# Patient Record
(demographics unavailable — no encounter records)

---

## 2025-01-08 NOTE — HISTORY OF PRESENT ILLNESS
[FreeTextEntry1] : Mr. Royal is being seen in neuro oncologic follow up after a prolonged hospitalization at [facility].  Mr. Royal is a 62 yo man with a PMH of obesity, HTN, CAD, s/p nstemi and CABG (3/2024) - course complicated by atrial fibrillation and pleural effusions requiring drainage.  In July, he began to experience gait unsteadiness with frequent falls, followed by nausea and vomiting and lethargy. An outpatient CTH reportedly showed ventricular enlargement and he was supposed to see a neurosurgeon - however, as his condition deteriorated, his family brought him to [place] ED. MRI of the brain had shown multiple enhancing intraventricular nodules. CT C/A/P ok - BM neg, PET/CT activity in brain. He underwent a VPS and right parietal biopsy 8/6 (Dr. Byrd) - diffuse large B cell Lymphoma. He was treated with 4 cycles of R-methotrexate - dose of MTX was reduced of 2.5 mg/m2 - complicated by prolonged clearance - LUMA, pancytopenia - melena - diarrhea - PEG placement and was ultimately able to get to [facility] on 11/26.  Family notes that over the past month - he has improved notably - eating - walking with PT - continent - not using PEG (although being flushed) - cognitively significantly improved.

## 2025-01-08 NOTE — DISCUSSION/SUMMARY
[FreeTextEntry1] : 62 yo man - medically complicated - CAD, CABG - presented with hydrocephalus - multiple intraventricular nodules - primary CNS lymphoma. Significant response to treatment but could not possibly tolerate any further mtx. Scan looks excellent today. He has improved toa bout 85% baseline. Would continue PT/OT and follow up in 2 months with repeat imaging. Any interval changes, family knows to call.

## 2025-01-08 NOTE — PHYSICAL EXAM
[FreeTextEntry1] : He is awake and alert - oriented to name, place, month and year. President and President elect. Fluent with full reception Memory 2/3 at 5 min EOMI and VFF Face symmetric and tongue midline Tone normal UE strength full LE good strength Stacia intact Ambulates - no walker with him able to walk with minimal assist.

## 2025-01-08 NOTE — DISCUSSION/SUMMARY
[FreeTextEntry1] : 64 yo man - medically complicated - CAD, CABG - presented with hydrocephalus - multiple intraventricular nodules - primary CNS lymphoma. Significant response to treatment but could not possibly tolerate any further mtx. Scan looks excellent today. He has improved toa bout 85% baseline. Would continue PT/OT and follow up in 2 months with repeat imaging. Any interval changes, family knows to call.

## 2025-01-08 NOTE — HISTORY OF PRESENT ILLNESS
[FreeTextEntry1] : Mr. Royal is being seen in neuro oncologic follow up after a prolonged hospitalization at [facility].  Mr. Royal is a 62 yo man with a PMH of obesity, HTN, CAD, s/p nstemi and CABG (3/2024) - course complicated by atrial fibrillation and pleural effusions requiring drainage.  In July, he began to experience gait unsteadiness with frequent falls, followed by nausea and vomiting and lethargy. An outpatient CTH reportedly showed ventricular enlargement and he was supposed to see a neurosurgeon - however, as his condition deteriorated, his family brought him to [place] ED. MRI of the brain had shown multiple enhancing intraventricular nodules. CT C/A/P ok - BM neg, PET/CT activity in brain. He underwent a VPS and right parietal biopsy 8/6 (Dr. Byrd) - diffuse large B cell Lymphoma. He was treated with 4 cycles of R-methotrexate - dose of MTX was reduced of 2.5 mg/m2 - complicated by prolonged clearance - LUMA, pancytopenia - melena - diarrhea - PEG placement and was ultimately able to get to [facility] on 11/26.  Family notes that over the past month - he has improved notably - eating - walking with PT - continent - not using PEG (although being flushed) - cognitively significantly improved.

## 2025-01-16 NOTE — HISTORY OF PRESENT ILLNESS
[de-identified] : Mr. Genny ruiz 63-year-old male, who was found to have multiple nodules of intraventricular brain tumors, some of which were causing obstructive hydrocephalus.  He is status post an endoscopic intraventricular biopsy of these lesions and placement of a right ventriculoperitoneal shunts by myself on July 26, 2024.  He improved cognitively significantly following the placement of ventriculoperitoneal shunt.  However, unfortunately, the biopsy specimen that was sent during this original surgery was misplaced.  Because of the need for tissue diagnosis, a repeat endoscopic intraventricular biopsy was indicated to obtain a tissue diagnosis on 8/6/2024.  Path:  diffuse large B cell Lymphoma. Charan Capellan at 4  Today he reports feeling well, feels like he is a progressing well, participating more in PT, more alert then before Denies headaches- Sitting or lying. He is fully conversant and cognitively significantly improved.

## 2025-01-16 NOTE — ASSESSMENT
[FreeTextEntry1] : Mr. Genny ruiz 63-year-old male, who was found to have multiple nodules of intraventricular brain tumors, some of which were causing obstructive hydrocephalus.  He is status post an endoscopic intraventricular biopsy of these lesions and placement of a right ventriculoperitoneal shunts by myself on July 26, 2024.  He improved cognitively significantly following the placement of ventriculoperitoneal shunt.  However, unfortunately, the biopsy specimen that was sent during this original surgery was misplaced.  Because of the need for tissue diagnosis, a repeat endoscopic intraventricular biopsy was indicated to obtain a tissue diagnosis on 8/6/2024.  Path:  diffuse large B cell Lymphoma. Charan Certas at 4  Today he reports feeling well, feels like he is a progressing well, participating more in PT, more alert then before Denies headaches- Sitting or lying. He is fully conversant and cognitively significantly improved.  Discussion: Clinically you are doing well- you feel well, symptoms are improving, no subdural hygromas or hematomas, no hedadaches. The right frontal horn is smaller than the left frontal horn, but there is no associated subdural hygroma and he has no headaches, so I do not believe the shunt is overdraining. If we lower the shunt to drain more there is a risk of overdrainage and causing subdural hygromas. If we raise the shunt to drain less, his neurologic symptoms may worsen. Therefore, I  recommend keeping the shunt at the current setting of 4. We interrogated the  shunt valve and confirmed it is still at a setting of 4. MRI shows no evidence of disease MRI as per Dr. Loo in 3/2025   .IDez evaluated the patient with the nurse practitioner Joann Barthelemy and established the plan of care. I personally discuss this patient with the nurse practitioner at the time of the visit. I agree with the assessment and plan as written, unless noted below.

## 2025-01-16 NOTE — PHYSICAL EXAM
[General Appearance - Alert] : alert [General Appearance - In No Acute Distress] : in no acute distress [General Appearance - Well Nourished] : well nourished [Oriented To Time, Place, And Person] : oriented to person, place, and time [Person] : oriented to person [Place] : oriented to place [Time] : oriented to time [Short Term Intact] : short term memory intact [Remote Intact] : remote memory intact [Registration Intact] : recent registration memory intact [Motor Strength] : muscle strength was normal in all four extremities [] : no respiratory distress [Involuntary Movements] : no involuntary movements were seen [Motor Tone] : muscle strength and tone were normal [Skin Color & Pigmentation] : normal skin color and pigmentation

## 2025-01-16 NOTE — HISTORY OF PRESENT ILLNESS
[de-identified] : Mr. Genny ruiz 63-year-old male, who was found to have multiple nodules of intraventricular brain tumors, some of which were causing obstructive hydrocephalus.  He is status post an endoscopic intraventricular biopsy of these lesions and placement of a right ventriculoperitoneal shunts by myself on July 26, 2024.  He improved cognitively significantly following the placement of ventriculoperitoneal shunt.  However, unfortunately, the biopsy specimen that was sent during this original surgery was misplaced.  Because of the need for tissue diagnosis, a repeat endoscopic intraventricular biopsy was indicated to obtain a tissue diagnosis on 8/6/2024.  Path:  diffuse large B cell Lymphoma. Charan Capellan at 4  Today he reports feeling well, feels like he is a progressing well, participating more in PT, more alert then before Denies headaches- Sitting or lying. He is fully conversant and cognitively significantly improved.

## 2025-01-27 NOTE — CARDIOLOGY SUMMARY
[TextEntry] : SHELDON W or WO Ultrasound Enhancing Agent (24 @ 09:43) > 1. Left ventricular systolic function is preserved with an ejection fraction visually estimated at 50 to 55 %. 2. Normal right ventricular cavity size and reduced systolic function. 3. Mild aortic regurgitation. 4. No evidence of left atrial or left atrial appendage thrombus. 5. No pericardial effusion seen.  TTE 3/23/24: CONCLUSIONS: 1. Left ventricular cavity is normal in size. Left ventricular systolic function is normal with an ejection fraction of 55 % by Pepper's method of disks. 2. Mild to moderate left ventricular hypertrophy. 3. Normal right ventricular cavity size, with normal wall thickness, and normal systolic function. 4. The left atrium is severely dilated. 5. Tricuspid aortic valve with normal leaflet excursion with normal systolic excursion. 6. Mild to moderate aortic regurgitation. 7. There is mild calcification of the mitral valve annulus. 8. Trace mitral regurgitation. 9. The inferior vena cava is normal in size measuring 2.00 cm in diameter, (normal <2.1cm) with normal inspiratory collapse (normal >50%) consistent with normal right atrial pressure (~3, range 0-5mmHg). 10. Trace pericardial effusion.  [facility] 3/25/24: Diagnostic Conclusions: Severe diffuse CAD Recommendations: CABG eval at NS with Dr. Prince Access Right radial artery: Coronary Angiography Diagnostic Findings: Coronary Angiography The coronary circulation is co-dominant. LAD Proximal left anterior descending: There is an 85 % stenosis. Mid left anterior descending: There is a 90 % stenosis. Distal left anterior descending: There is a 70 % stenosis. CX Proximal circumflex: There is an 85 % stenosis. RCA Mid right coronary artery: There is a 100 % stenosis.  CABG x 3 on 3/28/24: LIMA to LAD SVG to OM Radial artery Y graft to Ramus  SHELDON/DCCV on 24  EK24: SB, IVCD, TWI EKG 24: SB, IVCD, NSVT EKG 25: NSR, PACs, inferior infarct, poss anterior infarct

## 2025-01-27 NOTE — HISTORY OF PRESENT ILLNESS
[FreeTextEntry1] : Khai is a 63yoM PMH cirrhosis, morbid obesity, NSTEMI s/p LHC with multivessel disease s/p CABG x 3, pAF post op s/p SHELDON/DCCV, newly diagnosed DLBCL who presents for follow up  He was recently admitted to [facility]. He underwent chemotherapy at that time and was dc'd to rehab. He was dc'd on Coreg 6.25mg BID He still has a PEG in place but he is eating and drinking normally He has just left acute rehab on Thursday The PEG will be removed after denture placement  He had an MRI done in 1/2025: no signs of malignancy  He has repeat MRI scheduled for March 2025  Mr. Royal was initially seen at [facility] in 4/2024 for chest pain and was found to have an NSTEMI. Trops at that time peaked at 4500 A LHC was done which showed severe multivessel CAD and he underwent CABG x 3 Course was complicated by resp failure 2/2 volume overload and pleural effusions and he underwent diuresis and chest tube placement by CTS Course was also complicated by AF post op which persisted despite amiodarone and so he underwent SHELDON/DCCV He was seen by EP and recommended to continue Amiodarone and Eliquis for at least 30 days post DCCV (done on 4/4) and was recommended to follow up outpatient to assess for AF burden. Recommended to continue a 3 month course of amiodarone  He was seen by Dr. Prince on April 30th. Lasix was reduced from 80 to 40mg and Aldactone was decreased from 50 to 25mg. His diuretics have now been stopped due to persistent vomiting and nausea.

## 2025-01-27 NOTE — DISCUSSION/SUMMARY
[EKG obtained to assist in diagnosis and management of assessed problem(s)] : EKG obtained to assist in diagnosis and management of assessed problem(s) [FreeTextEntry1] : Khai is a 63yoM PMH cirrhosis, morbid obesity, NSTEMI s/p LHC with multivessel disease s/p CABG x 3, pAF post op s/p SHELDON/DCCV, newly diagnosed DLBCL who presents for a hospital follow up  I have personally reviewed patient's hospitalization including all available labs, imaging and discharge summary.  As noted above, recent hospitalization for newly diagnosed DLBCL. He is s/o chemotherapy (including methotrexate) and a  shunt. He is MUCH improved since I saw him last at [facility]. He is completely himself and has been home from Rehab since Thursday.  He is currently on Coreg 6.125mg BID, Lasix 40mg QD, Potassium, Keppra. He remains off ASA, Eliquis, Lipitor 80mg.  I will obtain a full set of blood work today. Lipitor will need to be resumed, likely at 80mg QD but will follow up his LFTs and lipid panel first.  His CBC will be done as well prior to resuming ASA. I will also reach out to Neuro and NSGY to ensure they are ok with ASA being resumed.  He has been off Eliquis for quite some time now given his hgb drops while in the hospital. A CBC will be done today. A 2 week ziopatch will be done as well to assess for any AF burden. He requires follow up with Dr. Farfan as well to see if Eliquis is needed. There was no evidence of AF during his hospitalization on telemetry. His EKG today shows PACs x 2.   #CAD: s/p CABG - Resume statin after repeat labs done today - ASA and Eliquis held given hgb drop on DC. Plan was to remain off ASA if Eliquis was to be resumed.  - Will repeat a CBC today and resume ASA as able.   #pAF:  - s/p DCCV and EP, following Dr. Farfan, has remained in NSR. Is >3 months from DCCV - Discussed with EP on previous admission. Given his preop SHELDON Showed severe LA dilatation, he has a high risk of recurrence of AF. Tele thus far has not shown any evidence of AF. Recommending outpatient zio on DC and possible ILR with follow up with Dr. Farfan. - 2 week ziopatch to be done, ordered.  - prior TTE from 4/24: LV systolic function is preserved with an ejection fraction visually estimated at 50 to 55%. - A repeat TTE will be done today as well.  - Continue Coreg at 6.25mg BID  #HTN:  - Continue Coreg, BP very well controlled   To follow up very closely in 8-12 weeks.

## 2025-02-06 NOTE — ASSESSMENT
[FreeTextEntry1] : CNS lymphoma s/p chemo with good response  f/u with oncology continue keppra  leg edema restart lasix and potassium - double dose for 3 days - per Dr Santana and call them again in 3 days lasix 40 mg and KCl 20 meq  CAD and A fib continue carvedilol and eliquis  depression continue fluoxetine  hyperlipidemia continue lipitor  f/u 2 months

## 2025-02-06 NOTE — HEALTH RISK ASSESSMENT
[No] : In the past 12 months have you used drugs other than those required for medical reasons? No [Little interest or pleasure doing things] : 1) Little interest or pleasure doing things [Feeling down, depressed, or hopeless] : 2) Feeling down, depressed, or hopeless [0] : 2) Feeling down, depressed, or hopeless: Not at all (0) [PHQ-2 Negative - No further assessment needed] : PHQ-2 Negative - No further assessment needed [[id number]] : 0 [Former] : Former [20 or more] : 20 or more [< 15 Years] : < 15 Years

## 2025-02-06 NOTE — HISTORY OF PRESENT ILLNESS
[Post-hospitalization from ___ Hospital] : Post-hospitalization from [unfilled] Hospital [Admitted on: ___] : The patient was admitted on [unfilled] [Discharged on ___] : discharged on [unfilled] [FreeTextEntry2] : Pt has been in and out of hospital and rehab from July 2024. He was fully in [place] from November.  His recent MRI shows no evidence of any cancer in the brain.  Feeling good, except for some fatigue.  He had heart bypass surgery last year.  Has a ventriculoperitoneal shunt for brain obstructive hydrocephalus. Had brain biopsy and confirmed lymphoma.  4 rounds of chemo.  having home health nurse and PT being set up. Has a heart monitor.  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  Stopped furosemide 40 mg and gained 8 lbs since stopping 3 days ago.  Off eliquis and aspirin for dental extractions.  He does still have a PEG which is not being used.

## 2025-02-06 NOTE — PHYSICAL EXAM
[Normal] : affect was normal and insight and judgment were intact [de-identified] : 1+ edema LE's, bilat [de-identified] : PEG in place

## 2025-03-05 NOTE — HISTORY OF PRESENT ILLNESS
[FreeTextEntry1] : Mr. Royal is being seen in neuro oncologic follow up  Mr. Royal is a 64 yo man with a PMH of obesity, HTN, CAD, s/p nstemi and CABG (3/2024) - course complicated by atrial fibrillation and pleural effusions requiring drainage.  In July, he began to experience gait unsteadiness with frequent falls, followed by nausea and vomiting and lethargy. An outpatient CTH reportedly showed ventricular enlargement and he was supposed to see a neurosurgeon - however, as his condition deteriorated, his family brought him to [place] ED. MRI of the brain had shown multiple enhancing intraventricular nodules. CT C/A/P ok - BM neg, PET/CT activity in brain. 8/6/2024- He underwent a VPS and right parietal biopsy (Dr. Byrd) - diffuse large B cell Lymphoma. He was treated with 4 cycles of R-methotrexate - dose of MTX was reduced of 2.5 mg/m2 - complicated by prolonged clearance - LUMA, pancytopenia - melena - diarrhea - PEG placement and was ultimately able to get to [facility] on 11/26.  1/8/2025- Family notes that over the past month - he has improved notably - eating - walking with PT - continent - not using PEG (although being flushed) - cognitively significantly improved. MRI showed (1/7/2025) No new enhancement to suggest recurrent/progressive disease. 3/5/2025- Here for a follow up. He continues to improve, eating well, PEG out (3/3/2025), continues with PT. Patient followed up with cardiologist today and was recommended to stop Eliquis

## 2025-03-05 NOTE — PHYSICAL EXAM
[General Appearance - Alert] : alert [General Appearance - In No Acute Distress] : in no acute distress [Oriented To Time, Place, And Person] : oriented to person, place, and time [Impaired Insight] : insight and judgment were intact [Affect] : the affect was normal [Mood] : the mood was normal [] : no respiratory distress [Respiration, Rhythm And Depth] : normal respiratory rhythm and effort [Exaggerated Use Of Accessory Muscles For Inspiration] : no accessory muscle use [FreeTextEntry1] : Patient seen and examined Alert, awake , Oriented X 3. Speech clear and fluent Able to spell word " HOUSE " forwards and backwards Able to do simple calculations PERRLA, EOMI, VFF Face symmetrical. Tongue protrudes midline DIAMOND x 4 with good and equal strength FFM, coordination equal bilaterally Sensation intact bilaterally Gait slow but  steady. Ambulating independently- uses a walker

## 2025-03-05 NOTE — DISCUSSION/SUMMARY
[FreeTextEntry1] : 63 yoM  - medically complicated - CAD, CABG - presented with hydrocephalus - multiple intraventricular nodules -endoscopic intraventricular biopsy was indicated to obtain a tissue diagnosis on 8/6/2024.- diffuse large B cell Lymphoma. He was treated with 4 cycles of R-methotrexate - Here with a new MRI.  PRIMARY CNS LYMPHOMA - Neurologically stable MRI reviewed from yesterday and I compared it with MRI from 1/7/2025- To my review  No new enhancement to suggest recurrent/progressive disease. VPS - Codman Certas at 4- checked and confirmed by Neurosurgery NP SEIZURE PROPHYLAXIS - No reported seizures. c/w Keppra 500 mg bid IMBALANCE GAIT - Continue with PT FOLLOW UP- Recommended to follow up with Neuro Ophthalmologist. Referral given. Will do a clinical follow up and MRI on 5/1/2025. In the interim, if any concerns patient knows to call our office

## 2025-03-05 NOTE — END OF VISIT
[FreeTextEntry3] : I, Dr. Ugo Farfan, personally performed the evaluation and management (E/M) services for this established patient who presents today with (a) new problem(s)/exacerbation of (an) existing condition(s).  That E/M includes conducting the examination, assessing all new/exacerbated conditions, and establishing a new plan of care.  Today my NP, Angelica Hernandez, was here to observe my evaluation and management services for this new problem/exacerbated condition to be followed going forward. [Time Spent: ___ minutes] : I have spent [unfilled] minutes of time on the encounter which excludes teaching and separately reported services.

## 2025-03-05 NOTE — REVIEW OF SYSTEMS
[Negative] : Heme/Lymph [Feeling Fatigued] : feeling fatigued [Fever] : no fever [Headache] : no headache [Weight Gain (___ Lbs)] : no recent weight gain [Chills] : no chills [Weight Loss (___ Lbs)] : no recent weight loss [Joint Pain] : no joint pain [Joint Swelling] : no joint swelling [Joint Stiffness] : no joint stiffness [Muscle Cramps] : no muscle cramps [Myalgia] : no myalgia [FreeTextEntry5] : see HPI [FreeTextEntry9] : gait imbalance

## 2025-03-05 NOTE — HISTORY OF PRESENT ILLNESS
[FreeTextEntry1] : I had the pleasure of seeing Khai Royal today in the arrythmia clinic at [facility]. As you well know, he is a pleasant 63-year-old man with a history of HTN, HL, cirrhosis, NSTEMI, CAD s/p 4V CABG (4/2024) with post-operative course c/b pleural effusion that required chest tube placement and AF. For rhythm management, he was loaded on Amiodarone and underwent a successful DCCV on 4/4/2024. He followed up in my clinic post cardioversion at which time his Amiodarone was discontinued with plans to repeat external monitor 1 month following. Unfortunately, shortly after this visit patient was hospitalized at [facility] for new onset dizziness and was diagnosed with obstructive hydrocephalus in the setting of diffuse large B cell Lymphoma. He underwent  shunt placement on 8/16/2024 as well as 4 rounds of chemotherapy, most recently in 10/2024. He has been in and out of rehab and is currently undergoing PT for issues with gait imbalance. He has had 1 mechanical fall since hospital discharge with no head strike. He wore the 2-week Zio from 2/3-2/17/2025 and no recurrent atrial arrythmias were noted. He presents today for follow up. He reports feeling well overall, jsut fatigued from the events that recently transpired. He denies any CP, palpitations, SOB, lightheadedness, dizziness, and syncope.  CHADS2-VASc 2: HTN (1), CAD (1)

## 2025-03-05 NOTE — PHYSICAL EXAM
[Well Developed] : well developed [Well Nourished] : well nourished [No Acute Distress] : no acute distress [Normal Conjunctiva] : normal conjunctiva [Normal Venous Pressure] : normal venous pressure [No Carotid Bruit] : no carotid bruit [Normal S1, S2] : normal S1, S2 [No Murmur] : no murmur [No Rub] : no rub [No Gallop] : no gallop [Clear Lung Fields] : clear lung fields [Good Air Entry] : good air entry [No Respiratory Distress] : no respiratory distress  [Soft] : abdomen soft [Non Tender] : non-tender [No Masses/organomegaly] : no masses/organomegaly [Normal Bowel Sounds] : normal bowel sounds [Normal Gait] : normal gait [No Edema] : no edema [No Cyanosis] : no cyanosis [No Clubbing] : no clubbing [No Varicosities] : no varicosities [No Rash] : no rash [No Skin Lesions] : no skin lesions [Moves all extremities] : moves all extremities [No Focal Deficits] : no focal deficits [Normal Speech] : normal speech [Alert and Oriented] : alert and oriented [Normal memory] : normal memory [Obese] : obese [Normal] : alert and oriented, normal memory

## 2025-03-05 NOTE — CARDIOLOGY SUMMARY
[de-identified] : 6/17/24: Sinus samson low 40s bpm. Old AWMI [de-identified] : 2/3-2/17/2025: Patient had a minimum HR of 43bpm, maximum HR of 169bpm, and an average HR of 67bpm. Predominant underlying rhythm was SR. 2 VT runs occurred, the run with the fastest interval lasting 5 beats with a max HR of 146 bpm, the longest lasting 5 beats with an average rate of 106bpm. 43 SVT runs occurred, the run with the fastest interval lasting 4 beats with a max rate of 169bpm, the longest lasting 14 beats with an average HR of 110bpm. Isolated SVEs were frequent (12%), SVE couplets were rare (<1%), SVE triplets were rare (<1%). Isolated VEs were rare (<1%), VE couplets were rare (<1%), and VE triplets were rare (<0.1%). Ventricular bigeminy and trigeminy were present.    3/5/2025: Sinus rhythm @ 78 with APCs; old anterior infarct  [de-identified] : 1/30/2025: LVEF 55%. No RWMAs. Mild LVH. Normal RVSF. Normal LA/RA. No significant valvulopathies.

## 2025-03-05 NOTE — DISCUSSION/SUMMARY
[FreeTextEntry1] : In summary, Mr. Royal is a 63 year old male who underwent a CABG in 4/2024 following an NSTEMI. He developed AF post-operatively for which he was started on Amiodarone and underwent a successful cardioversion. His Amiodarone was stopped at his office visit in 6/2024 with no recurrent atrial arrythmias noted on Zio worn last month. Given no recurrent AF/AFL and patient's clinical presentation (i.e. gait imbalance, falls), we will discontinue his OAC. Recommended to patient to invest in an external monitoring device, such as a Dinsmore Steele/Apple watch or ImmuMetrixa device, for continued arrythmia surveillance. He is agreeable to this purchase and will promptly alert our office if any AF is documented. He will follow up with our office as needed.  He appeared to understand the whole discussion and verbalized that all of his questions were answered to his satisfaction.  Thank you for allowing me to be involved in the care of this pleasant man. Please feel free to contact me with any questions.  Ugo Farfan MD  of Cardiology Electrophysiology Section [address] [place] [address] Office: [phone] Fax: [phone] [EKG obtained to assist in diagnosis and management of assessed problem(s)] : EKG obtained to assist in diagnosis and management of assessed problem(s)

## 2025-04-02 NOTE — REASON FOR VISIT
[Follow-Up: _____] : a [unfilled] follow-up visit [Family Member] : family member [FreeTextEntry1] : Dizziness

## 2025-04-02 NOTE — PHYSICAL EXAM
[General Appearance - Alert] : alert [General Appearance - In No Acute Distress] : in no acute distress [General Appearance - Well Nourished] : well nourished [Oriented To Time, Place, And Person] : oriented to person, place, and time [Person] : oriented to person [Place] : oriented to place [Time] : oriented to time [Registration Intact] : recent registration memory intact [Motor Strength] : muscle strength was normal in all four extremities [Involuntary Movements] : no involuntary movements were seen [Motor Tone] : muscle strength and tone were normal [Skin Color & Pigmentation] : normal skin color and pigmentation [FreeTextEntry1] : Cerats shunt checked and confirmed twice at 4.  [Affect] : the affect was normal [Cranial Nerves Optic (II)] : visual acuity intact bilaterally,  pupils equal round and reactive to light [Cranial Nerves Oculomotor (III)] : extraocular motion intact [Cranial Nerves Facial (VII)] : face symmetrical [Cranial Nerves Vestibulocochlear (VIII)] : hearing was intact bilaterally [Cranial Nerves Accessory (XI - Cranial And Spinal)] : head turning and shoulder shrug symmetric [Cranial Nerves Hypoglossal (XII)] : there was no tongue deviation with protrusion [FreeTextEntry8] : no drift, mild to moderate imbalance at walk, with walker pt is stable [Sclera] : the sclera and conjunctiva were normal [PERRL With Normal Accommodation] : pupils were equal in size, round, reactive to light, with normal accommodation [Both Tympanic Membranes Were Examined] : both tympanic membranes were normal [] : the neck was supple [Heart Rate And Rhythm] : heart rate was normal and rhythm regular [No Spinal Tenderness] : no spinal tenderness

## 2025-04-02 NOTE — HISTORY OF PRESENT ILLNESS
[FreeTextEntry1] : Mr. Genny ruiz 63-year-old male, who was found to have multiple nodules of intraventricular brain tumors, some of which were causing obstructive hydrocephalus.  He is status post an endoscopic intraventricular biopsy of these lesions and placement of a right ventriculoperitoneal shunts (CERTAS) by Dr. Byrd on July 26, 2024.  He improved cognitively significantly following the placement of ventriculoperitoneal shunt.  A repeat endoscopic intraventricular biopsy was indicated to obtain a tissue diagnosis on 8/6/2024. Path:  diffuse large B cell Lymphoma. Codman Certas shunt was at 4.   Today he reports having dizziness and balance issue from this morning. He is here to check the shunt as he was advised to do so if any dizziness happens. He had no MRI done recently, last MRI was a month ago and the shunt was checked after the imaging.  He was supposed to go to work but feels significant dizziness and balance issues. He feels it more at walking with imbalance. He had his breakfast, hydrating well. He does PT for strengthening. Pt has upcoming appointment with cardiology in the next week. He has MRI head scheduled in 5/1/25 with Dr. Rivera. He denies headaches,

## 2025-04-02 NOTE — ASSESSMENT
[FreeTextEntry1] : IMPRESSION  63-year-old male, who was found to have multiple nodules of intraventricular brain tumors and s/p endoscopic placement of a right ventriculoperitoneal shunts on July 26, 2024 by Dr. Byrd.  He improved cognitively significantly following the placement of ventriculoperitoneal shunt.  A repeat endoscopic intraventricular biopsy was indicated to obtain a tissue diagnosis on 8/6/2024. Path:  diffuse large B cell Lymphoma. Codman Certas shunt was set at 4 as per the last note. Pt reports having dizziness and balance issue from this morning and he is here to check the shunt as he was advised to do so if any dizziness happens. He had no MRI done recently, last MRI was a month ago and the shunt was checked after that imaging.  He feels his dizziness is more at walking associated with imbalance. He had his breakfast, hydrating well. He has MRI head scheduled in 5/1/25 with Dr. Rivera. Pt does not have any other symptoms, denies headache.   PLAN:  Shunt checked and confirmed at 4, reconfirmed twice Advised that he should see his PCP and do more work up May go to urgent care/ ER with worsening symptoms Advised to discuss with Dr. Rivera about possibility of rescheduling MRI to an earlier time, may ask Antivert for dizziness  Discussed the possibility of doing some balance therapy if all work up comes back negative, may discuss with his doctors  F/U with Dr. Rivera/Dr. Byrd as scheduled.

## 2025-04-09 NOTE — HISTORY OF PRESENT ILLNESS
[Family Member] : family member [FreeTextEntry1] : follow up CNS lymphoma [de-identified] : Had PEG removed a couple weeks ago. Getting fitted for dentures. 5/7/25 has neuro ophthalmology appt.  He is eating soft foods and not the healthiest diet.  He had cholesterol checked today.  He uses a walker to walk.  Had MI 3/22/24 and stopped smoking at that time.  Stopped drinking alcohol in 1992.

## 2025-04-09 NOTE — DISCUSSION/SUMMARY
[EKG obtained to assist in diagnosis and management of assessed problem(s)] : EKG obtained to assist in diagnosis and management of assessed problem(s) [FreeTextEntry1] : Khai is a 63yoM PMH cirrhosis, morbid obesity, NSTEMI s/p LHC with multivessel disease s/p CABG x 3, pAF post op s/p SHELDON/DCCV, newly diagnosed DLBCL who presents for follow up  As noted above, previous hospitalization for newly diagnosed DLBCL. He is s/o chemotherapy (including methotrexate) and a  shunt. He is much improved.  He is currently on Coreg 6.125mg BID, Lasix 40mg QD, Potassium, Keppra. He remains off Eliquis given no recurrence of AF, seen by Dr. Johnson. He is back on ASA 81mg daily and Lipitor 40mg daily. A repeat direct ldl will be done today to ensure he does not need 80mg for an LDL goal of <70.   He has been off Eliquis for quite some time now given his hgb drops while in the hospital. A CBC will be done today. A 2 week ziopatch was done which showed no AF but frequent PVCs at 12.0%.   #CAD: s/p CABG - Lipitor 40mg was resumed in 1/2025, a repeat lipid panel and direct LDL will be done today.  - ASA now resumed.   #pAF: - s/p DCCV and EP, following Dr. Johnson, has remained in [facility]. Is >3 months from DCCV - Discussed with EP on previous admission. Given his preop SHELDON Showed severe LA dilatation, he has a high risk of recurrence of AF. A zio was done which showed no AF but frequent PVCs at 12.0%. He has been seen by Dr johnson and remains off AC with monitoring with his apple watch - prior TTE from 4/24: LV systolic function is preserved with an ejection fraction visually estimated at 50 to 55%. - A repeat TTE was done 1/2025 which showed normal LV function, normal LA size, mild AI, dilated aorta. Repeat TTE to be done in ~1 year.  - Continue Coreg at 6.25mg BID  #HTN: - Continue Coreg, BP very well controlled  To follow up in 3 months.

## 2025-04-09 NOTE — HISTORY OF PRESENT ILLNESS
[FreeTextEntry1] : Khai is a 63yoM PMH cirrhosis, morbid obesity, NSTEMI s/p LHC with multivessel disease s/p CABG x 3, pAF post op s/p SHELDON/DCCV, newly diagnosed DLBCL who presents for follow up  He was last seen in January of 2025 and was overall feeling well.  BPs at home have been in the 130s systolic when taken 2 hours after meds 1 week ago, he felt unsteady on his feet and could not walk in a straight line A repeat MRI was done which was stable and the  shunt was checked and they were told it was normal. BP was normal at the time. He felt unsteady on his feet.  PEG was removed on 3/10/25 He was seen by Dr. Farfan and taken off Eliquis given no recurrence of AF  He was previously admitted to [facility] (~12/2024). He underwent chemotherapy at that time and was dc'd to rehab. He was dc'd on Coreg 6.25mg BID He still has a PEG in place but he is eating and drinking normally He has just left acute rehab on Thursday The PEG will be removed after denture placement He had an MRI done in 1/2025: no signs of malignancy He has repeat MRI scheduled for March 2025  Mr. Royal was initially seen at [facility] in 4/2024 for chest pain and was found to have an NSTEMI. Trops at that time peaked at 4500 A LHC was done which showed severe multivessel CAD and he underwent CABG x 3 Course was complicated by resp failure 2/2 volume overload and pleural effusions and he underwent diuresis and chest tube placement by CTS Course was also complicated by AF post op which persisted despite amiodarone and so he underwent SHELDON/DCCV He was seen by EP and recommended to continue Amiodarone and Eliquis for at least 30 days post DCCV (done on 4/4) and was recommended to follow up outpatient to assess for AF burden. Recommended to continue a 3 month course of amiodarone  He was seen by Dr. Prince on April 30th. Lasix was reduced from 80 to 40mg and Aldactone was decreased from 50 to 25mg. His diuretics have now been stopped due to persistent vomiting and nausea.

## 2025-04-09 NOTE — CARDIOLOGY SUMMARY
[TextEntry] : SHELDON W or WO Ultrasound Enhancing Agent (24 @ 09:43) > 1. Left ventricular systolic function is preserved with an ejection fraction visually estimated at 50 to 55 %. 2. Normal right ventricular cavity size and reduced systolic function. 3. Mild aortic regurgitation. 4. No evidence of left atrial or left atrial appendage thrombus. 5. No pericardial effusion seen.  TTE 3/23/24: 1. Left ventricular cavity is normal in size. Left ventricular systolic function is normal with an ejection fraction of 55 % by Pepper's method of disks. 2. Mild to moderate left ventricular hypertrophy. 3. Normal right ventricular cavity size, with normal wall thickness, and normal systolic function. 4. The left atrium is severely dilated. 5. Tricuspid aortic valve with normal leaflet excursion with normal systolic excursion. 6. Mild to moderate aortic regurgitation. 7. There is mild calcification of the mitral valve annulus. 8. Trace mitral regurgitation. 9. The inferior vena cava is normal in size measuring 2.00 cm in diameter, (normal <2.1cm) with normal inspiratory collapse (normal >50%) consistent with normal right atrial pressure (~3, range 0-5mmHg). 10. Trace pericardial effusion.  [facility] 3/25/24: Diagnostic Conclusions: Severe diffuse CAD Recommendations: CABG eval at NS with Dr. Prince The coronary circulation is co-dominant. LAD Proximal left anterior descending: There is an 85 % stenosis. Mid left anterior descending: There is a 90 % stenosis. Distal left anterior descending: There is a 70 % stenosis. CX Proximal circumflex: There is an 85 % stenosis. RCA Mid right coronary artery: There is a 100 % stenosis.  CABG x 3 on 3/28/24: LIMA to LAD SVG to OM Radial artery Y graft to Ramus  SHELDON/DCCV on 24  EK24: SB, IVCD, TWI EKG 24: SB, IVCD, NSVT EKG 25: NSR, PACs, inferior infarct, poss anterior infarct EKG 25: SR, frequent PACs, infetolateral TWI  Ziopatch 2025: SR, 2 episodes of NSVT, frequent PVCs at 12.0%.   TTE 2025: 1. Left ventricular cavity is normal in size. Left ventricular systolic function is normal with an ejection fraction of 55 % by Pepper's method of disks. There are no regional wall motion abnormalities seen. 2. Mild left ventricular hypertrophy. 3. Normal right ventricular cavity size and normal right ventricular systolic function. 4. Left atrium is normal in size. 6. Mild mitral regurgitation. 8. Mild aortic regurgitation. 9. Aortic root at the sinuses of Valsalva is dilated, measuring 4.20 cm (indexed 1.98 cm/m).

## 2025-04-09 NOTE — ASSESSMENT
[FreeTextEntry1] : CNS lymphoma improved and not seen on recent scan he returned to work part time continue keppra  HTN continue carvedilol  hyperlipidemia continue lipitor  depression continue fluoxetine  insomnia continue melatonin

## 2025-04-09 NOTE — HEALTH RISK ASSESSMENT
[No] : In the past 12 months have you used drugs other than those required for medical reasons? No [Little interest or pleasure doing things] : 1) Little interest or pleasure doing things [Feeling down, depressed, or hopeless] : 2) Feeling down, depressed, or hopeless [0] : 2) Feeling down, depressed, or hopeless: Not at all (0) [PHQ-2 Negative - No further assessment needed] : PHQ-2 Negative - No further assessment needed [Former] : Former [20 or more] : 20 or more [< 15 Years] : < 15 Years [[id number]] : 0

## 2025-06-02 NOTE — HISTORY OF PRESENT ILLNESS
[FreeTextEntry1] : Mr. Royal is being seen in neuro oncologic follow up  Mr. Royal is a 64 yo man with a PMH of obesity, HTN, CAD, s/p nstemi and CABG (3/2024) - course complicated by atrial fibrillation and pleural effusions requiring drainage.  In July, he began to experience gait unsteadiness with frequent falls, followed by nausea and vomiting and lethargy. An outpatient CTH reportedly showed ventricular enlargement and he was supposed to see a neurosurgeon - however, as his condition deteriorated, his family brought him to [place] ED. MRI of the brain had shown multiple enhancing intraventricular nodules. CT C/A/P ok - BM neg, PET/CT activity in brain. 8/6/2024- He underwent a VPS and right parietal biopsy (Dr. Byrd) - diffuse large B cell Lymphoma. He was treated with 4 cycles of R-methotrexate - dose of MTX was reduced of 2.5 mg/m2 - complicated by prolonged clearance - LUMA, pancytopenia - melena - diarrhea - PEG placement and was ultimately able to get to [facility] on 11/26.  1/8/2025- Family notes that over the past month - he has improved notably - eating - walking with PT - continent - not using PEG (although being flushed) - cognitively significantly improved. MRI showed (1/7/2025) No new enhancement to suggest recurrent/progressive disease. 3/5/2025- He continues to improve, eating well, PEG out (3/3/2025), continues with PT. Patient followed up with cardiologist today and was recommended to stop Eliquis. MRI stable. 4/8/2025- MRI stable

## 2025-06-03 NOTE — REASON FOR VISIT
[Follow-Up: _____] : a [unfilled] follow-up visit [FreeTextEntry1] : Mr. Genny ruiz 63-year-old male, who was found to have multiple nodules of intraventricular brain tumors, some of which were causing obstructive hydrocephalus. He is status post an endoscopic intraventricular biopsy of these lesions and placement of a right ventriculoperitoneal shunts by Dr. Byrd on July 26, 2024. He improved cognitively significantly following the placement of ventriculoperitoneal shunt. However, unfortunately, the biopsy specimen that was sent during this original surgery was misplaced. Because of the need for tissue diagnosis, a repeat endoscopic intraventricular biopsy was indicated to obtain a tissue diagnosis on 8/6/2024.  Today he presents to office for shunt check after MRI  Charan Capellan at 4

## 2025-06-04 NOTE — PHYSICAL EXAM
[General Appearance - Alert] : alert [General Appearance - In No Acute Distress] : in no acute distress [Oriented To Time, Place, And Person] : oriented to person, place, and time [] : no respiratory distress [Respiration, Rhythm And Depth] : normal respiratory rhythm and effort [Exaggerated Use Of Accessory Muscles For Inspiration] : no accessory muscle use [Edema] : there was no peripheral edema [FreeTextEntry1] : Patient seen and examined Alert, awake , Oriented X 3. Speech clear and fluent Memory at 3 minutes - 2/3, with cues 3/3 PERRLA, EOMI, VFF Face symmetrical. Tongue protrudes midline DIAMOND x 4 with good and equal strength FFM, coordination equal bilaterally Sensation intact bilaterally Gait slow but steady. Ambulating independently- uses a cane

## 2025-06-04 NOTE — HISTORY OF PRESENT ILLNESS
[FreeTextEntry1] : Mr. Royal is being seen in neuro oncologic follow up  Mr. Royal is a 62 yo man with a PMH of obesity, HTN, CAD, s/p nstemi and CABG (3/2024) - course complicated by atrial fibrillation and pleural effusions requiring drainage.  In July, he began to experience gait unsteadiness with frequent falls, followed by nausea and vomiting and lethargy. An outpatient CTH reportedly showed ventricular enlargement and he was supposed to see a neurosurgeon - however, as his condition deteriorated, his family brought him to [place] ED. MRI of the brain had shown multiple enhancing intraventricular nodules. CT C/A/P ok - BM neg, PET/CT activity in brain. 8/6/2024- He underwent a VPS and right parietal biopsy (Dr. Byrd) - diffuse large B cell Lymphoma. He was treated with 4 cycles of R-methotrexate - dose of MTX was reduced of 2.5 mg/m2 - complicated by prolonged clearance - LUMA, pancytopenia - melena - diarrhea - PEG placement and was ultimately able to get to [facility] on 11/26.  1/8/2025- Family notes that over the past month - he has improved notably - eating - walking with PT - continent - not using PEG (although being flushed) - cognitively significantly improved. MRI showed (1/7/2025) No new enhancement to suggest recurrent/progressive disease. 3/5/2025- He continues to improve, eating well, PEG out (3/3/2025), continues with PT. Patient followed up with cardiologist today and was recommended to stop Eliquis. MRI stable. 4/8/2025- MRI stable 6/4/2025- Here for a follow up. Reports overall doing ok. He lives alone and manages to take care of himself, working full time. His daughter getting  on the 20th and is looking forward for it.

## 2025-06-04 NOTE — DISCUSSION/SUMMARY
[FreeTextEntry1] : 63 yoM - medically complicated - CAD, CABG - presented with hydrocephalus - multiple intraventricular nodules -endoscopic intraventricular biopsy was indicated to obtain a tissue diagnosis on 8/6/2024.- diffuse large B cell Lymphoma. He was treated with 4 cycles of R-methotrexate - Here with a new MRI. PRIMARY CNS LYMPHOMA - Neurologically stable MRI reviewed from today and I compared it with MRI from 4/8/2025,  3/5/2025- To my review No new enhancement to suggest recurrent/progressive disease. VPS - Codman Certas at 4- checked and confirmed by Neurosurgery NP Joann Barthelemy SEIZURE PROPHYLAXIS - No reported seizures. c/w Keppra 500 mg bid FOLLOW UP- Will do a clinical follow up and MRI on 8/6/2025. (coordinate with Ale LEE for shunt check as well pls) In the interim, if any concerns patient knows to call our office.

## 2025-06-18 NOTE — HISTORY OF PRESENT ILLNESS
[Coronary Artery Disease] : coronary artery disease [(Patient denies any chest pain, claudication, dyspnea on exertion, orthopnea, palpitations or syncope)] : Patient denies any chest pain, claudication, dyspnea on exertion, orthopnea, palpitations or syncope [Aortic Stenosis] : no aortic stenosis [Atrial Fibrillation] : no atrial fibrillation [Recent Myocardial Infarction] : no recent myocardial infarction [Implantable Device/Pacemaker] : no implantable device/pacemaker [Smoker] : not a smoker [Family Member] : no family member with adverse anesthesia reaction/sudden death [Self] : no previous adverse anesthesia reaction [Chronic Anticoagulation] : no chronic anticoagulation [Chronic Kidney Disease] : no chronic kidney disease [Diabetes] : no diabetes [FreeTextEntry1] : cataract surgery [FreeTextEntry4] : Here for preop clearance for cataract surgery.  h/o MI March 2024 and s/p CABG March 2024. Quit smoking at that time.  He no longer takes melatonin.  s/p treatment for CNS lymphoma and still has a shunt. He is here today with his wife.   [Spouse] : spouse

## 2025-06-18 NOTE — ASSESSMENT
[Patient Optimized for Surgery] : Patient optimized for surgery [FreeTextEntry4] : CAD, s/p CABG stable continue ASA 81 mg  HTN continue carvedilol and losartan  hyperlipidemia continue lipitor  depression continue fluoxetine  obesity and hepatic steatosis advised healthy eating and lose weight  CNS lymphoma responded well to tx he continue to take keppra

## 2025-06-18 NOTE — PHYSICAL EXAM
[No Acute Distress] : no acute distress [Well Nourished] : well nourished [Well Developed] : well developed [Well-Appearing] : well-appearing [Normal Sclera/Conjunctiva] : normal sclera/conjunctiva [PERRL] : pupils equal round and reactive to light [EOMI] : extraocular movements intact [Normal Outer Ear/Nose] : the outer ears and nose were normal in appearance [Normal Oropharynx] : the oropharynx was normal [No JVD] : no jugular venous distention [No Lymphadenopathy] : no lymphadenopathy [Supple] : supple [Thyroid Normal, No Nodules] : the thyroid was normal and there were no nodules present [No Respiratory Distress] : no respiratory distress  [No Accessory Muscle Use] : no accessory muscle use [Clear to Auscultation] : lungs were clear to auscultation bilaterally [Normal Rate] : normal rate  [Regular Rhythm] : with a regular rhythm [Normal S1, S2] : normal S1 and S2 [No Murmur] : no murmur heard [No Carotid Bruits] : no carotid bruits [No Abdominal Bruit] : a ~M bruit was not heard ~T in the abdomen [No Varicosities] : no varicosities [Pedal Pulses Present] : the pedal pulses are present [No Edema] : there was no peripheral edema [No Palpable Aorta] : no palpable aorta [No Extremity Clubbing/Cyanosis] : no extremity clubbing/cyanosis [Soft] : abdomen soft [Non Tender] : non-tender [Non-distended] : non-distended [No Masses] : no abdominal mass palpated [No HSM] : no HSM [Normal Bowel Sounds] : normal bowel sounds [Normal Posterior Cervical Nodes] : no posterior cervical lymphadenopathy [Normal Anterior Cervical Nodes] : no anterior cervical lymphadenopathy [No CVA Tenderness] : no CVA  tenderness [No Spinal Tenderness] : no spinal tenderness [No Joint Swelling] : no joint swelling [Grossly Normal Strength/Tone] : grossly normal strength/tone [No Rash] : no rash [Coordination Grossly Intact] : coordination grossly intact [No Focal Deficits] : no focal deficits [Normal Gait] : normal gait [Deep Tendon Reflexes (DTR)] : deep tendon reflexes were 2+ and symmetric [Normal Affect] : the affect was normal [Normal Insight/Judgement] : insight and judgment were intact [de-identified] : RRR with ectopics

## 2025-07-25 NOTE — PHYSICAL EXAM

## 2025-07-25 NOTE — HISTORY OF PRESENT ILLNESS
[FreeTextEntry1] : Khai is a 63yoM PMH cirrhosis, morbid obesity, NSTEMI s/p LHC with multivessel disease s/p CABG x 3, pAF post op s/p SHELDON/DCCV, newly diagnosed DLBCL who presents for follow up  He was last seen in April of 2025 and was overall feeling well at that time.  He remains cancer free. He had a recent MRI without disease. Repeat MRI ordered for next week.   He was previously seen in January of 2025 and was overall feeling well. BPs at home have been in the 130s systolic when taken 2 hours after meds 1 week ago, he felt unsteady on his feet and could not walk in a straight line A repeat MRI was done which was stable and the  shunt was checked and they were told it was normal. BP was normal at the time. He felt unsteady on his feet. PEG was removed on 3/10/25 He was seen by Dr. Farfan and taken off Eliquis given no recurrence of AF  He was previously admitted to [facility] (~12/2024). He underwent chemotherapy at that time and was dc'd to rehab. He was dc'd on Coreg 6.25mg BID He still has a PEG in place but he is eating and drinking normally He has just left acute rehab on Thursday The PEG will be removed after denture placement He had an MRI done in 1/2025: no signs of malignancy He has repeat MRI scheduled for March 2025  Mr. Royal was initially seen at [facility] in 4/2024 for chest pain and was found to have an NSTEMI. Trops at that time peaked at 4500 A LHC was done which showed severe multivessel CAD and he underwent CABG x 3 Course was complicated by resp failure 2/2 volume overload and pleural effusions and he underwent diuresis and chest tube placement by CTS Course was also complicated by AF post op which persisted despite amiodarone and so he underwent SHELDON/DCCV He was seen by EP and recommended to continue Amiodarone and Eliquis for at least 30 days post DCCV (done on 4/4) and was recommended to follow up outpatient to assess for AF burden. Recommended to continue a 3 month course of amiodarone  He was seen by Dr. Prince on April 30th. Lasix was reduced from 80 to 40mg and Aldactone was decreased from 50 to 25mg. His diuretics have now been stopped due to persistent vomiting and nausea.

## 2025-07-25 NOTE — CARDIOLOGY SUMMARY
[TextEntry] : SHELDON W or WO Ultrasound Enhancing Agent (24 @ 09:43) > 1. Left ventricular systolic function is preserved with an ejection fraction visually estimated at 50 to 55 %. 2. Normal right ventricular cavity size and reduced systolic function. 3. Mild aortic regurgitation. 4. No evidence of left atrial or left atrial appendage thrombus. 5. No pericardial effusion seen.  TTE 3/23/24: 1. Left ventricular cavity is normal in size. Left ventricular systolic function is normal with an ejection fraction of 55 % by Pepper's method of disks. 2. Mild to moderate left ventricular hypertrophy. 3. Normal right ventricular cavity size, with normal wall thickness, and normal systolic function. 4. The left atrium is severely dilated. 5. Tricuspid aortic valve with normal leaflet excursion with normal systolic excursion. 6. Mild to moderate aortic regurgitation. 8. Trace mitral regurgitation. 9. The inferior vena cava is normal in size measuring 2.00 cm in diameter, (normal <2.1cm) with normal inspiratory collapse (normal >50%) consistent with normal right atrial pressure (~3, range 0-5mmHg). 10. Trace pericardial effusion.  [facility] 3/25/24: Severe diffuse CAD Recommendations: CABG eval at NS with Dr. Prince The coronary circulation is co-dominant. LAD Proximal left anterior descending: There is an 85 % stenosis. Mid left anterior descending: There is a 90 % stenosis. Distal left anterior descending: There is a 70 % stenosis. CX Proximal circumflex: There is an 85 % stenosis. RCA Mid right coronary artery: There is a 100 % stenosis.  CABG x 3 on 3/28/24: LIMA to LAD SVG to OM Radial artery Y graft to Ramus  SHELDON/DCCV on 24  EK24: SB, IVCD, TWI EKG 24: SB, IVCD, NSVT EKG 25: NSR, PACs, inferior infarct, poss anterior infarct EKG 25: SR, frequent PACs, infetolateral TWI EKG 25: SR, frequent PACs, non specific t wave  Ziopatch 2025: SR, 2 episodes of NSVT, frequent PVCs at 12.0%.  TTE 2025: 1. Left ventricular cavity is normal in size. Left ventricular systolic function is normal with an ejection fraction of 55 % by Pepper's method of disks. There are no regional wall motion abnormalities seen. 2. Mild left ventricular hypertrophy. 3. Normal right ventricular cavity size and normal right ventricular systolic function. 6. Mild mitral regurgitation. 8. Mild aortic regurgitation. 9. Aortic root at the sinuses of Valsalva is dilated, measuring 4.20 cm (indexed 1.98 cm/m).

## 2025-07-25 NOTE — PHYSICAL EXAM

## 2025-07-25 NOTE — DISCUSSION/SUMMARY
[EKG obtained to assist in diagnosis and management of assessed problem(s)] : EKG obtained to assist in diagnosis and management of assessed problem(s) [FreeTextEntry1] : Khai is a 63yoM PMH cirrhosis, morbid obesity, NSTEMI s/p LHC with multivessel disease s/p CABG x 3, pAF post op s/p SHELDON/DCCV, newly diagnosed DLBCL who presents for follow up  As noted above, previous hospitalization for newly diagnosed DLBCL. He is s/o chemotherapy (including methotrexate) and a  shunt. He is much improved. He is currently on Coreg 6.25mg BID, Lasix 40mg QD, Losartan 25mg, Potassium, Keppra. He remains off Eliquis given no recurrence of AF, seen by Dr. Johnson. He is back on ASA 81mg daily and Lipitor 40mg daily. A repeat direct ldl was done in April of 2025 and was 58.   He has been off Eliquis for quite some time now given his hgb drops while in the hospital. A CBC will be done today. A 2 week ziopatch was done which showed no AF but frequent PVCs at 12.0%.  #CAD: s/p CABG - Lipitor 40mg was resumed in 1/2025, LDL is very well controlled.  - ASA now resumed.  #pAF: - s/p DCCV and EP, following Dr. Johnson, has remained in NSR. Is >3 months from DCCV - Discussed with EP on previous admission. Given his preop SHELDON Showed severe LA dilatation, he has a high risk of recurrence of AF. A zio was done which showed no AF but frequent PVCs at 12.0%. He has been seen by Dr johnson and remains off AC with monitoring with his apple watch - prior TTE from 4/24: LV systolic function is preserved with an ejection fraction visually estimated at 50 to 55%. - A repeat TTE was done 1/2025 which showed normal LV function, normal LA size, mild AI, dilated aorta. Repeat TTE to be done in ~1 year. - Continue Coreg at 6.25mg BID  #HTN: - Continue Coreg - Increase Losartan to 50mg daily - BP check and BMP in 1 week  #Dilated aorta:  Repeat TTE in 1/2026  To follow up in 3  months.

## 2025-07-25 NOTE — HISTORY OF PRESENT ILLNESS
[FreeTextEntry1] : Khai is a 63yoM PMH cirrhosis, morbid obesity, NSTEMI s/p LHC with multivessel disease s/p CABG x 3, pAF post op s/p SHELDON/DCCV, newly diagnosed DLBCL who presents for follow up  He was last seen in April of 2025 and was overall feeling well at that time.  He remains cancer free. He had a recent MRI without disease. Repeat MRI ordered for next week.   He was previously seen in January of 2025 and was overall feeling well. BPs at home have been in the 130s systolic when taken 2 hours after meds 1 week ago, he felt unsteady on his feet and could not walk in a straight line A repeat MRI was done which was stable and the  shunt was checked and they were told it was normal. BP was normal at the time. He felt unsteady on his feet. PEG was removed on 3/10/25 He was seen by Dr. Farfan and taken off Eliquis given no recurrence of AF  He was previously admitted to [facility] (~12/2024). He underwent chemotherapy at that time and was dc'd to rehab. He was dc'd on Coreg 6.25mg BID He still has a PEG in place but he is eating and drinking normally He has just left acute rehab on Thursday The PEG will be removed after denture placement He had an MRI done in 1/2025: no signs of malignancy He has repeat MRI scheduled for March 2025  Mr. Royal was initially seen at [facility] in 4/2024 for chest pain and was found to have an NSTEMI. Trops at that time peaked at 4500 A LHC was done which showed severe multivessel CAD and he underwent CABG x 3 Course was complicated by resp failure 2/2 volume overload and pleural effusions and he underwent diuresis and chest tube placement by CTS Course was also complicated by AF post op which persisted despite amiodarone and so he underwent SHELDON/DCCV He was seen by EP and recommended to continue Amiodarone and Eliquis for at least 30 days post DCCV (done on 4/4) and was recommended to follow up outpatient to assess for AF burden. Recommended to continue a 3 month course of amiodarone  He was seen by Dr. Prince on April 30th. Lasix was reduced from 80 to 40mg and Aldactone was decreased from 50 to 25mg. His diuretics have now been stopped due to persistent vomiting and nausea.

## 2025-07-25 NOTE — CARDIOLOGY SUMMARY
[TextEntry] : SHELDON W or WO Ultrasound Enhancing Agent (24 @ 09:43) > 1. Left ventricular systolic function is preserved with an ejection fraction visually estimated at 50 to 55 %. 2. Normal right ventricular cavity size and reduced systolic function. 3. Mild aortic regurgitation. 4. No evidence of left atrial or left atrial appendage thrombus. 5. No pericardial effusion seen.  TTE 3/23/24: 1. Left ventricular cavity is normal in size. Left ventricular systolic function is normal with an ejection fraction of 55 % by Pepper's method of disks. 2. Mild to moderate left ventricular hypertrophy. 3. Normal right ventricular cavity size, with normal wall thickness, and normal systolic function. 4. The left atrium is severely dilated. 5. Tricuspid aortic valve with normal leaflet excursion with normal systolic excursion. 6. Mild to moderate aortic regurgitation. 8. Trace mitral regurgitation. 9. The inferior vena cava is normal in size measuring 2.00 cm in diameter, (normal <2.1cm) with normal inspiratory collapse (normal >50%) consistent with normal right atrial pressure (~3, range 0-5mmHg). 10. Trace pericardial effusion.  [facility] 3/25/24: Severe diffuse CAD Recommendations: CABG eval at NS with Dr. Prince The coronary circulation is co-dominant. LAD Proximal left anterior descending: There is an 85 % stenosis. Mid left anterior descending: There is a 90 % stenosis. Distal left anterior descending: There is a 70 % stenosis. CX Proximal circumflex: There is an 85 % stenosis. RCA Mid right coronary artery: There is a 100 % stenosis.  CABG x 3 on 3/28/24: LIMA to LAD SVG to OM Radial artery Y graft to Ramus  SHELDON/DCCV on 24  EK24: SB, IVCD, TWI EKG 24: SB, IVCD, NSVT EKG 25: NSR, PACs, inferior infarct, poss anterior infarct EKG 25: SR, frequent PACs, infetolateral TWI EKG 25: SR, frequent PACs, non specific t wave  Ziopatch 2025: SR, 2 episodes of NSVT, frequent PVCs at 12.0%.  TTE 2025: 1. Left ventricular cavity is normal in size. Left ventricular systolic function is normal with an ejection fraction of 55 % by Pepper's method of disks. There are no regional wall motion abnormalities seen. 2. Mild left ventricular hypertrophy. 3. Normal right ventricular cavity size and normal right ventricular systolic function. 6. Mild mitral regurgitation. 8. Mild aortic regurgitation. 9. Aortic root at the sinuses of Valsalva is dilated, measuring 4.20 cm (indexed 1.98 cm/m).